# Patient Record
Sex: FEMALE | URBAN - METROPOLITAN AREA
[De-identification: names, ages, dates, MRNs, and addresses within clinical notes are randomized per-mention and may not be internally consistent; named-entity substitution may affect disease eponyms.]

---

## 2024-02-14 ENCOUNTER — OFFICE VISIT (OUTPATIENT)
Dept: OBSTETRICS AND GYNECOLOGY | Facility: CLINIC | Age: 39
End: 2024-02-14

## 2024-02-14 VITALS
SYSTOLIC BLOOD PRESSURE: 108 MMHG | HEIGHT: 64 IN | BODY MASS INDEX: 32.27 KG/M2 | WEIGHT: 189 LBS | DIASTOLIC BLOOD PRESSURE: 68 MMHG

## 2024-02-14 DIAGNOSIS — Z30.013 INITIATION OF DEPO PROVERA: Primary | ICD-10-CM

## 2024-02-14 PROCEDURE — 1036F TOBACCO NON-USER: CPT | Performed by: OBSTETRICS & GYNECOLOGY

## 2024-02-14 PROCEDURE — 99203 OFFICE O/P NEW LOW 30 MIN: CPT | Performed by: OBSTETRICS & GYNECOLOGY

## 2024-02-14 RX ORDER — MEDROXYPROGESTERONE ACETATE 150 MG/ML
150 INJECTION, SUSPENSION INTRAMUSCULAR
Qty: 1 ML | Refills: 3 | Status: SHIPPED | OUTPATIENT
Start: 2024-02-14

## 2024-02-14 NOTE — PROGRESS NOTES
Subjective   Patient ID: Mary Ayala is a 38 y.o. female who presents for Consult contraception.  New patient.  She was referred here by Dr. Boothe.  She is .  2 children both by .  Children are 20 years old and 11 years old.  Not planning more children looking for contraceptive options.  For the past 11 years they have been careful    No surgeries no meds non-smoker    Ankles are monthly    We reviewed pros and cons of permanent versus reversible forms of contraception.  She is self-pay so cost is an issue.  After reviewing options we will give her a pamphlet on the Mirena but she is interested in Depo-Provera to begin with.  Reviewed side effects including possible weight gain and mood swings.  Prescription called in for Depo-Provera.  If she changes her mind she can see us for Mirena        Review of Systems   All other systems reviewed and are negative.      Objective   Physical Exam  Constitutional:       Appearance: Normal appearance. She is obese.   Neurological:      Mental Status: She is alert.         Assessment/Plan   Family-planning.  2 prior C-sections.  Prescription for Depo-Provera.  Information on Mirena         Paul Coronel MD 24 1:36 PM